# Patient Record
(demographics unavailable — no encounter records)

---

## 2024-11-25 NOTE — PHYSICAL EXAM
[General Appearance - Well Developed] : well developed [General Appearance - Well Nourished] : well nourished [Normal Appearance] : normal appearance [Well Groomed] : well groomed [General Appearance - In No Acute Distress] : no acute distress [] : no respiratory distress [Normal Station and Gait] : the gait and station were normal for the patient's age [Exaggerated Use Of Accessory Muscles For Inspiration] : no accessory muscle use [Affect] : the affect was normal [Oriented To Time, Place, And Person] : oriented to person, place, and time [Mood] : the mood was normal [Not Anxious] : not anxious

## 2024-11-25 NOTE — HISTORY OF PRESENT ILLNESS
[FreeTextEntry1] : She is a 48-year-old woman who is seen today for initial visit.  She has previous history of kidney stones and has undergone ureteroscopy and shockwave lithotripsy.  In November 2024, she went to the emergency room for left-sided flank pain.  After studies were done she was discharged.  She still has pain but it is controlled.  White blood cell count was 9, creatinine was 0.9, and urine culture was contaminated.  CT scan report is indicated below which showed bilateral kidney stones and 2 stones in the left mid ureter the larger one was 6 mm.  She may have Crohn's disease.  CT report from November 2024: KIDNEYS/URETERS: 2 obstructing calculi in the mid left ureter, the larger of which is more distal and measures 6 mm. There is moderate hydroureteronephrosis proximally with periureteral fat stranding and delayed left nephrogram. Additional nonobstructing calculi bilaterally. Right renal cyst. No right-sided hydronephrosis.

## 2024-11-25 NOTE — ASSESSMENT
[FreeTextEntry1] : We reviewed her CT scan images on the computer screen.  Tamsulosin and Toradol was Proscar for her today.  Urine culture will be repeated.  Kidney stones may be completely asymptomatic or cause mild to severe flank pain radiating to the front. Renal colic is generally associated with nausea and vomiting. Kidney stones can vary in size and shape. The main types are calcium, uric acid, struvite and cystine stones. Diagnostic studies for nephrolithiasis may include urine studies, imaging studies with CT scan, x-ray or ultrasound. Asymptomatic renal stones could be observed or surgical treatment options may be considered. Small ureteral stones generally can pass spontaneously with pain management, hydration and alpha-blocker therapy. Persistent nausea and vomiting, fever, chills and uncontrolled pain require visit to the emergency room.  Surgical treatment options are shockwave lithotripsy, laser lithotripsy with ureteroscopy and percutaneous stone removal. After any of these treatments a stent or a drainage catheter may be used. Generally, the location, size of the stone and comorbid factors dictate which treatment is the best option. Risks of the above procedures include fever, chills, urinary retention, injury to the urinary system, staged procedure, etc.  She would rather hold off on surgery at this time since she was very uncomfortable with stent previously.  Renal ultrasound will be done in about 2 weeks.  Indications for return to emergency room were discussed.  She will stay hydrated.